# Patient Record
Sex: FEMALE | Race: WHITE | NOT HISPANIC OR LATINO | Employment: UNEMPLOYED | URBAN - NONMETROPOLITAN AREA
[De-identification: names, ages, dates, MRNs, and addresses within clinical notes are randomized per-mention and may not be internally consistent; named-entity substitution may affect disease eponyms.]

---

## 2023-12-05 ENCOUNTER — OFFICE VISIT (OUTPATIENT)
Dept: CARDIOLOGY | Facility: CLINIC | Age: 28
End: 2023-12-05
Payer: COMMERCIAL

## 2023-12-05 VITALS
HEIGHT: 64 IN | OXYGEN SATURATION: 98 % | BODY MASS INDEX: 29.33 KG/M2 | HEART RATE: 91 BPM | WEIGHT: 171.8 LBS | SYSTOLIC BLOOD PRESSURE: 140 MMHG | DIASTOLIC BLOOD PRESSURE: 90 MMHG

## 2023-12-05 DIAGNOSIS — R07.2 PRECORDIAL PAIN: ICD-10-CM

## 2023-12-05 DIAGNOSIS — R06.09 DYSPNEA ON EXERTION: ICD-10-CM

## 2023-12-05 DIAGNOSIS — R42 DIZZINESS: ICD-10-CM

## 2023-12-05 DIAGNOSIS — R00.2 PALPITATIONS: Primary | ICD-10-CM

## 2023-12-05 NOTE — PROGRESS NOTES
"Subjective     Yue Maloney is a 28 y.o. female who presents today for Establish Care (Cardiac Eval. ), Palpitations, Chest Pain (Chest pressure), and Rapid Heart Rate.    CHIEF COMPLIANT  Chief Complaint   Patient presents with    Establish Care     Cardiac Eval.     Palpitations    Chest Pain     Chest pressure    Rapid Heart Rate       Active Problems:  Palpitations  Chest pain  Dizziness  Current smoker      HPI  The patient is a 28-year-old female that was referred for further cardiac evaluation due to recent episodes of chest pain and palpitations.  The patient states that around the first of November she started having episodes where she felt like her heart \"pauses.\"  She will feel like she \"ran a marathon.\"  She will have chest pain which starts midsternal and radiates to the left side and to the top of her back.  When this occurs she has significant fatigue.  The symptoms are occurring every couple days.  She does have associated shortness of breath.  She denies syncope reviewed but will have some occasional dizziness during symptoms.  89 her blood pressure is 110/74.      PRIOR MEDS  No current outpatient medications on file prior to visit.     No current facility-administered medications on file prior to visit.       ALLERGIES  Sulfamethoxazole-trimethoprim    HISTORY  Past Medical History:   Diagnosis Date    Pleurisy        Social History     Socioeconomic History    Marital status:    Tobacco Use    Smoking status: Every Day     Packs/day: 1.00     Years: 14.00     Additional pack years: 0.00     Total pack years: 14.00     Types: Cigarettes    Smokeless tobacco: Never   Substance and Sexual Activity    Alcohol use: Never    Drug use: Never    Sexual activity: Defer       Family History   Problem Relation Age of Onset    Hypertension Mother     Other Father         small arteries    Heart murmur Sister     Hyperlipidemia Maternal Grandmother     Hypertension Maternal Grandmother     Lung cancer " "Maternal Grandmother     Diabetes Maternal Grandmother     COPD Maternal Grandmother     COPD Maternal Grandfather     Hypertension Maternal Grandfather     Heart attack Maternal Grandfather         defibrillator    Other Maternal Grandfather     Hypertension Paternal Grandmother     Stomach cancer Paternal Grandmother     Hyperthyroidism Paternal Grandmother     Heart murmur Paternal Grandmother     COPD Paternal Grandfather     Heart failure Son     Other Son         VSD    Heart murmur Son        Review of Systems   Constitutional:  Positive for fatigue. Negative for chills, diaphoresis and fever.   Eyes: Negative.  Negative for visual disturbance.   Respiratory:  Positive for cough, chest tightness, shortness of breath (diagnosed with Pleurisy on 2 weeks ago) and wheezing. Negative for apnea.    Cardiovascular:  Positive for chest pain (worse at night) and palpitations (heart racing). Negative for leg swelling.   Gastrointestinal: Negative.  Negative for blood in stool.   Endocrine: Negative.  Negative for cold intolerance and heat intolerance.   Genitourinary: Negative.  Negative for hematuria.   Musculoskeletal: Negative.  Negative for arthralgias, back pain, myalgias, neck pain and neck stiffness.   Skin: Negative.  Negative for color change, rash and wound.   Allergic/Immunologic: Negative.  Negative for environmental allergies and food allergies.   Neurological:  Positive for headaches (1 to 2 a week). Negative for dizziness, syncope, weakness, light-headedness and numbness.   Hematological: Negative.  Does not bruise/bleed easily.   Psychiatric/Behavioral:  Positive for sleep disturbance (wakes up with chest pain and worse at night).        Objective     VITALS: /90 (BP Location: Left arm, Patient Position: Sitting)   Pulse 91   Ht 162.6 cm (64\")   Wt 77.9 kg (171 lb 12.8 oz)   SpO2 98%   BMI 29.49 kg/m²     LABS:   Lab Results (most recent)       None            IMAGING:   No Images in the past " 120 days found..    EXAM:  Physical Exam  Constitutional:       Appearance: Normal appearance.   Eyes:      Pupils: Pupils are equal, round, and reactive to light.   Cardiovascular:      Rate and Rhythm: Normal rate and regular rhythm.      Pulses:           Carotid pulses are 2+ on the right side and 2+ on the left side.       Radial pulses are 2+ on the right side and 2+ on the left side.        Dorsalis pedis pulses are 2+ on the right side and 2+ on the left side.        Posterior tibial pulses are 2+ on the right side and 2+ on the left side.      Heart sounds: Normal heart sounds.   Pulmonary:      Effort: Pulmonary effort is normal.      Breath sounds: Normal breath sounds.   Abdominal:      General: Bowel sounds are normal.      Palpations: Abdomen is soft.   Musculoskeletal:      Right lower leg: No edema.      Left lower leg: No edema.   Skin:     General: Skin is warm and dry.      Capillary Refill: Capillary refill takes less than 2 seconds.   Neurological:      General: No focal deficit present.      Mental Status: She is alert and oriented to person, place, and time.   Psychiatric:         Mood and Affect: Mood normal.         Thought Content: Thought content normal.         Procedure     ECG 12 Lead    Date/Time: 12/5/2023 2:57 PM  Performed by: Yue Castro APRN    Authorized by: Yue Castro APRN  Previous ECG: no previous ECG available  Rhythm: sinus rhythm  Rate: normal  BPM: 72  Conduction: conduction normal  ST Segments: ST segments normal  T Waves: T waves normal  QRS axis: normal  Comments: No acute changes  Qtc 420ms             Assessment & Plan    Diagnosis Plan   1. Palpitations  Mobile Cardiac Outpatient Telemetry      2. Precordial pain  Treadmill Stress Test    Adult Transthoracic Echo Complete W/ Cont if Necessary Per Protocol      3. Dizziness  Treadmill Stress Test    Adult Transthoracic Echo Complete W/ Cont if Necessary Per Protocol      4. Dyspnea on exertion   Treadmill Stress Test    Adult Transthoracic Echo Complete W/ Cont if Necessary Per Protocol        Plan:  1.  Palpitations will place patient on a cardiac event monitor to see if we can determine the etiology of her palpitations.  Will plan to see the patient back to review results.  2.  Precordial pain: We will schedule patient for treadmill stress test for ischemic evaluation and an echocardiogram to evaluate LV function and structural anatomy.  3.  Dizziness we will proceed with testing as scribed above.  4.  Dyspnea on exertion: We will proceed with testing as described above.  He will get    No follow-ups on file.    Yue Haider  reports that she has been smoking cigarettes. She has a 14.00 pack-year smoking history. She has never used smokeless tobacco.. I have educated her on the risk of diseases from using tobacco products such as cancer, COPD, and heart disease.     I advised her to quit and she is not willing to quit.    I spent 3  minutes counseling the patient.    The patient is currently on no regular medications.             MEDS ORDERED DURING VISIT:  No orders of the defined types were placed in this encounter.      DISCONTINUED MEDS DURING VISIT:   There are no discontinued medications.       This document has been electronically signed by OCTAVOI Soto  December 5, 2023 16:01 EST    Dictated Utilizing Dragon Dictation: Part of this note may be an electronic transcription/translation of spoken language to printed text using the Dragon Dictation System

## 2024-02-08 ENCOUNTER — HOSPITAL ENCOUNTER (OUTPATIENT)
Dept: CARDIOLOGY | Facility: HOSPITAL | Age: 29
Discharge: HOME | End: 2024-02-08
Payer: COMMERCIAL

## 2024-02-08 DIAGNOSIS — R06.09 DYSPNEA ON EXERTION: ICD-10-CM

## 2024-02-08 DIAGNOSIS — R42 DIZZINESS: ICD-10-CM

## 2024-02-08 DIAGNOSIS — R07.2 PRECORDIAL PAIN: ICD-10-CM

## 2024-02-08 LAB
BH CV ECHO MEAS - ACS: 2 CM
BH CV ECHO MEAS - AO MAX PG: 8.5 MMHG
BH CV ECHO MEAS - AO MEAN PG: 5 MMHG
BH CV ECHO MEAS - AO ROOT DIAM: 2.7 CM
BH CV ECHO MEAS - AO V2 MAX: 146 CM/SEC
BH CV ECHO MEAS - AO V2 VTI: 28.9 CM
BH CV ECHO MEAS - EDV(CUBED): 84 ML
BH CV ECHO MEAS - EDV(MOD-SP4): 70.5 ML
BH CV ECHO MEAS - EF(MOD-SP4): 59.4 %
BH CV ECHO MEAS - EF_3D-VOL: 62 %
BH CV ECHO MEAS - ESV(CUBED): 17.6 ML
BH CV ECHO MEAS - ESV(MOD-SP4): 28.6 ML
BH CV ECHO MEAS - FS: 40.6 %
BH CV ECHO MEAS - IVS/LVPW: 0.93 CM
BH CV ECHO MEAS - IVSD: 0.95 CM
BH CV ECHO MEAS - LA DIMENSION: 2.9 CM
BH CV ECHO MEAS - LAT PEAK E' VEL: 19.6 CM/SEC
BH CV ECHO MEAS - LV DIASTOLIC VOL/BSA (35-75): 38.5 CM2
BH CV ECHO MEAS - LV MASS(C)D: 144 GRAMS
BH CV ECHO MEAS - LV SYSTOLIC VOL/BSA (12-30): 15.6 CM2
BH CV ECHO MEAS - LVIDD: 4.4 CM
BH CV ECHO MEAS - LVIDS: 2.6 CM
BH CV ECHO MEAS - LVPWD: 1.02 CM
BH CV ECHO MEAS - MED PEAK E' VEL: 11.7 CM/SEC
BH CV ECHO MEAS - MV A MAX VEL: 83.1 CM/SEC
BH CV ECHO MEAS - MV DEC SLOPE: 516 CM/SEC2
BH CV ECHO MEAS - MV E MAX VEL: 83.6 CM/SEC
BH CV ECHO MEAS - MV E/A: 1.01
BH CV ECHO MEAS - RVDD: 2.13 CM
BH CV ECHO MEAS - SI(MOD-SP4): 22.9 ML/M2
BH CV ECHO MEAS - SV(MOD-SP4): 41.9 ML
BH CV ECHO MEASUREMENTS AVERAGE E/E' RATIO: 5.34
BH CV STRESS RECOVERY BP: NORMAL MMHG
BH CV STRESS RECOVERY HR: 117 BPM
BH CV XLRA - RV BASE: 3.2 CM
BH CV XLRA - RV LENGTH: 7.2 CM
BH CV XLRA - RV MID: 2.4 CM
LEFT ATRIUM VOLUME INDEX: 18 ML/M2
MAXIMAL PREDICTED HEART RATE: 190 BPM
PERCENT MAX PREDICTED HR: 95.26 %
STRESS BASELINE BP: NORMAL MMHG
STRESS BASELINE HR: 100 BPM
STRESS PERCENT HR: 112 %
STRESS POST ESTIMATED WORKLOAD: 7 METS
STRESS POST EXERCISE DUR MIN: 6 MIN
STRESS POST PEAK BP: NORMAL MMHG
STRESS POST PEAK HR: 181 BPM
STRESS TARGET HR: 162 BPM

## 2024-02-08 PROCEDURE — 93306 TTE W/DOPPLER COMPLETE: CPT

## 2024-02-08 PROCEDURE — 93017 CV STRESS TEST TRACING ONLY: CPT

## 2024-02-09 ENCOUNTER — TELEPHONE (OUTPATIENT)
Dept: CARDIOLOGY | Facility: CLINIC | Age: 29
End: 2024-02-09
Payer: COMMERCIAL

## 2024-02-09 NOTE — TELEPHONE ENCOUNTER
Notified pt of OCTAVIO Simms's recommendations.  Pt requesting to know if we have monitor results back. Per OCTAVIO Simms we have not received but will call when we these have been reviewed. Pt verbalizes understanding and recommendations. Pt is aware to call with any questions or concerns.    Faxed results to pcp     ----- Message from OCTAVIO Greene sent at 2/9/2024  8:09 AM EST -----  No EKG evidence of ischemia.  No exercise induced dysrhythmia . Echo pending.      Stress test results    Result Text  1.  Adequate functional capacity without chest pain.  The patient exercised 6 minutes on a Alhaji protocol to a heart rate of 181, systolic blood pressure 193, and O2 consumption of 7 METS, and to 95% of age-adjusted maximum predicted heart rate.     2.  Somewhat exaggerated heart rate and blood pressure responses to exercise     3.  No EKG evidence of ischemia.     4.  No exercise-induced dysrhythmia.

## 2024-02-14 LAB
BH CV ECHO MEAS - ACS: 2 CM
BH CV ECHO MEAS - AO MAX PG: 8.5 MMHG
BH CV ECHO MEAS - AO MEAN PG: 5 MMHG
BH CV ECHO MEAS - AO ROOT DIAM: 2.7 CM
BH CV ECHO MEAS - AO V2 MAX: 146 CM/SEC
BH CV ECHO MEAS - AO V2 VTI: 28.9 CM
BH CV ECHO MEAS - EDV(CUBED): 84 ML
BH CV ECHO MEAS - EDV(MOD-SP4): 70.5 ML
BH CV ECHO MEAS - EF(MOD-SP4): 59.4 %
BH CV ECHO MEAS - EF_3D-VOL: 62 %
BH CV ECHO MEAS - ESV(CUBED): 17.6 ML
BH CV ECHO MEAS - ESV(MOD-SP4): 28.6 ML
BH CV ECHO MEAS - FS: 40.6 %
BH CV ECHO MEAS - IVS/LVPW: 0.93 CM
BH CV ECHO MEAS - IVSD: 0.95 CM
BH CV ECHO MEAS - LA DIMENSION: 2.9 CM
BH CV ECHO MEAS - LAT PEAK E' VEL: 19.6 CM/SEC
BH CV ECHO MEAS - LV DIASTOLIC VOL/BSA (35-75): 38.5 CM2
BH CV ECHO MEAS - LV MASS(C)D: 144 GRAMS
BH CV ECHO MEAS - LV SYSTOLIC VOL/BSA (12-30): 15.6 CM2
BH CV ECHO MEAS - LVIDD: 4.4 CM
BH CV ECHO MEAS - LVIDS: 2.6 CM
BH CV ECHO MEAS - LVPWD: 1.02 CM
BH CV ECHO MEAS - MED PEAK E' VEL: 11.7 CM/SEC
BH CV ECHO MEAS - MV A MAX VEL: 83.1 CM/SEC
BH CV ECHO MEAS - MV DEC SLOPE: 516 CM/SEC2
BH CV ECHO MEAS - MV E MAX VEL: 83.6 CM/SEC
BH CV ECHO MEAS - MV E/A: 1.01
BH CV ECHO MEAS - RVDD: 2.13 CM
BH CV ECHO MEAS - SI(MOD-SP4): 22.9 ML/M2
BH CV ECHO MEAS - SV(MOD-SP4): 41.9 ML
BH CV ECHO MEASUREMENTS AVERAGE E/E' RATIO: 5.34
BH CV XLRA - RV BASE: 3.2 CM
BH CV XLRA - RV LENGTH: 7.2 CM
BH CV XLRA - RV MID: 2.4 CM
LEFT ATRIUM VOLUME INDEX: 18 ML/M2

## 2024-02-19 ENCOUNTER — TELEPHONE (OUTPATIENT)
Dept: CARDIOLOGY | Facility: CLINIC | Age: 29
End: 2024-02-19
Payer: COMMERCIAL

## 2024-02-19 NOTE — TELEPHONE ENCOUNTER
ECHO  Pt notified of no acute findings. Provider will discuss results at f/u. Pt reminded of appt date and time.  ----- Message from Gema De La Fuente LPN sent at 2/19/2024  8:37 AM EST -----    ----- Message -----  From: Yue Castro APRN  Sent: 2/16/2024   1:46 PM EST  To: Gema De La Fuente LPN    EF 55-60%.  No significant valvular abnormalities.

## 2024-03-05 ENCOUNTER — OFFICE VISIT (OUTPATIENT)
Dept: CARDIOLOGY | Facility: CLINIC | Age: 29
End: 2024-03-05
Payer: COMMERCIAL

## 2024-03-05 ENCOUNTER — LAB (OUTPATIENT)
Dept: LAB | Facility: HOSPITAL | Age: 29
End: 2024-03-05
Payer: COMMERCIAL

## 2024-03-05 VITALS
DIASTOLIC BLOOD PRESSURE: 89 MMHG | SYSTOLIC BLOOD PRESSURE: 129 MMHG | WEIGHT: 174.8 LBS | OXYGEN SATURATION: 96 % | HEART RATE: 91 BPM | HEIGHT: 64 IN | BODY MASS INDEX: 29.84 KG/M2

## 2024-03-05 DIAGNOSIS — R00.2 PALPITATIONS: Primary | ICD-10-CM

## 2024-03-05 DIAGNOSIS — R00.0 SINUS TACHYCARDIA: ICD-10-CM

## 2024-03-05 DIAGNOSIS — R07.2 PRECORDIAL PAIN: ICD-10-CM

## 2024-03-05 DIAGNOSIS — R00.1 BRADYCARDIA, SINUS: ICD-10-CM

## 2024-03-05 DIAGNOSIS — R55 NEAR SYNCOPE: ICD-10-CM

## 2024-03-05 DIAGNOSIS — I47.10 PSVT (PAROXYSMAL SUPRAVENTRICULAR TACHYCARDIA): ICD-10-CM

## 2024-03-05 LAB
CHOLEST SERPL-MCNC: 248 MG/DL (ref 0–200)
HDLC SERPL-MCNC: 34 MG/DL (ref 40–60)
LDLC SERPL CALC-MCNC: 175 MG/DL (ref 0–100)
LDLC/HDLC SERPL: 5.06 {RATIO}
TRIGL SERPL-MCNC: 209 MG/DL (ref 0–150)
VLDLC SERPL-MCNC: 39 MG/DL (ref 5–40)

## 2024-03-05 PROCEDURE — 99214 OFFICE O/P EST MOD 30 MIN: CPT | Performed by: CLINICAL NURSE SPECIALIST

## 2024-03-05 PROCEDURE — 80061 LIPID PANEL: CPT

## 2024-03-05 PROCEDURE — 36415 COLL VENOUS BLD VENIPUNCTURE: CPT

## 2024-03-05 RX ORDER — AMOXICILLIN 500 MG/1
1000 CAPSULE ORAL 2 TIMES DAILY
COMMUNITY

## 2024-03-05 NOTE — PROGRESS NOTES
Subjective     Yue Maloney is a 28 y.o. female who presents today for Follow-up (Stress/echo/monitor results).    CHIEF COMPLIANT  Chief Complaint   Patient presents with    Follow-up     Stress/echo/monitor results       Active Problems:  Palpitations  1.1 cardiac event monitor 12/2023: 2 episodes of SVT no patient triggered.  Episodes of sinus tachycardia with rate up to 184.  Episode of sinus bradycardia with heart rate 45 bpm.  Patient triggered events associated with sinus mechanism.  Chest pain  2.1 2/8/24: treadmill stress: No EKG evidence of ischemia.  No exercise-induced dysrhythmia.  Somewhat exaggerated heart rate and blood pressure response to exercise.  Dizziness  Current smoker  5.  Echocardiogram 2/8/2024: EF 55 to 60%.  No significant valvular abnormality.       HPI  The patient is a 28-year-old female that returns for follow-up to discuss recent testing.  She underwent a treadmill stress test on 2/8/2024 which showed no EKG evidence of ischemia and no exercise-induced dysrhythmia.  There was a somewhat exaggerated heart rate and blood pressure response to exercise.  She underwent an echocardiogram on 2/8/2024 which showed an EF of 55 to 60% and no significant valvular abnormality.  She was placed on a cardiac event monitor which showed 2 brief episodes of SVT.  There were episodes of sinus tachycardia with heart rate up to 184.  There was also an episode of sinus bradycardia with heart rate down to 45 bpm.  Since the patient's last visit she has had an emergency department visit due to palpitations, tachycardia and chest pain.  The patient states that she was at home and started having palpitations.  She ended up going to primary care where an EKG was completed which showed sinus tachycardia with heart rate 150.  This occurred on December 13.  In review of her event monitor from around the same time she did have episodes of sinus tachycardia with rates as high as 184.  In the emergency department  her EKG showed sinus tachycardia with no acute changes.  Her potassium was 3.5.  Magnesium 2.2.  Troponins are negative x 2.  TSH was 0.593.  D-dimer was negative.   The patient states that since the end of January she has had multiple episodes of near syncope and when she checks her heart rate it will be in the low 40s.  Her heart rate has been as low as 38.  On her event monitor her heart rate got as low as 45.  On review of this it did occur when the patient was awake.      PRIOR MEDS  Current Outpatient Medications on File Prior to Visit   Medication Sig Dispense Refill    amoxicillin (AMOXIL) 500 MG capsule Take 2 capsules by mouth 2 (Two) Times a Day.       No current facility-administered medications on file prior to visit.       ALLERGIES  Sulfamethoxazole-trimethoprim    HISTORY  Past Medical History:   Diagnosis Date    Pleurisy        Social History     Socioeconomic History    Marital status:    Tobacco Use    Smoking status: Every Day     Current packs/day: 1.00     Average packs/day: 1 pack/day for 14.0 years (14.0 ttl pk-yrs)     Types: Cigarettes    Smokeless tobacco: Never   Substance and Sexual Activity    Alcohol use: Never    Drug use: Never    Sexual activity: Defer       Family History   Problem Relation Age of Onset    Hypertension Mother     Other Father         small arteries    Heart murmur Sister     Hyperlipidemia Maternal Grandmother     Hypertension Maternal Grandmother     Lung cancer Maternal Grandmother     Diabetes Maternal Grandmother     COPD Maternal Grandmother     COPD Maternal Grandfather     Hypertension Maternal Grandfather     Heart attack Maternal Grandfather         defibrillator    Other Maternal Grandfather     Hypertension Paternal Grandmother     Stomach cancer Paternal Grandmother     Hyperthyroidism Paternal Grandmother     Heart murmur Paternal Grandmother     COPD Paternal Grandfather     Heart failure Son     Other Son         VSD    Heart murmur Son   "      Review of Systems   Constitutional:  Positive for fatigue. Negative for chills, diaphoresis and fever.   HENT:  Positive for sore throat (had strep throat last week).    Eyes: Negative.  Negative for visual disturbance.   Respiratory:  Positive for chest tightness. Negative for apnea, cough, shortness of breath and wheezing.    Cardiovascular:  Positive for chest pain (some chest pain yesterday; none today) and palpitations (occas.). Negative for leg swelling.   Gastrointestinal: Negative.  Negative for constipation.   Endocrine: Negative.  Negative for cold intolerance and heat intolerance.   Genitourinary: Negative.  Negative for hematuria.   Musculoskeletal: Negative.  Negative for arthralgias, back pain, myalgias, neck pain and neck stiffness.   Skin: Negative.  Negative for color change, rash and wound.   Allergic/Immunologic: Negative.  Negative for environmental allergies and food allergies.   Neurological: Negative.  Negative for dizziness, syncope, weakness, light-headedness, numbness and headaches.   Hematological: Negative.  Does not bruise/bleed easily.   Psychiatric/Behavioral:  Positive for sleep disturbance (waking up occas with palps).        Objective     VITALS: /89 (BP Location: Left arm, Patient Position: Sitting)   Pulse 91   Ht 162.6 cm (64.02\")   Wt 79.3 kg (174 lb 12.8 oz)   SpO2 96%   BMI 29.99 kg/m²     LABS:   Lab Results (most recent)       None            IMAGING:   No Images in the past 120 days found..    EXAM:    Constitutional:       Appearance: Normal appearance.   Eyes:      Pupils: Pupils are equal, round, and reactive to light.   Cardiovascular:      Rate and Rhythm: Normal rate and regular rhythm.      Pulses:           Carotid pulses are 2+ on the right side and 2+ on the left side.       Radial pulses are 2+ on the right side and 2+ on the left side.        Dorsalis pedis pulses are 2+ on the right side and 2+ on the left side.        Posterior tibial pulses " are 2+ on the right side and 2+ on the left side.      Heart sounds: Normal heart sounds.   Pulmonary:      Effort: Pulmonary effort is normal.      Breath sounds: Normal breath sounds.   Abdominal:      General: Bowel sounds are normal.      Palpations: Abdomen is soft.   Musculoskeletal:      Right lower leg: No edema.      Left lower leg: No edema.   Skin:     General: Skin is warm and dry.      Capillary Refill: Capillary refill takes less than 2 seconds.   Neurological:      General: No focal deficit present.      Mental Status: She is alert and oriented to person, place, and time.   Psychiatric:         Mood and Affect: Mood normal.         Thought Content: Thought content normal.      Procedure   Procedures       Assessment & Plan    Diagnosis Plan   1. Palpitations  Ambulatory Referral to Cardiac Electrophysiology    Mobile Cardiac Outpatient Telemetry      2. PSVT (paroxysmal supraventricular tachycardia)  Ambulatory Referral to Cardiac Electrophysiology      3. Sinus tachycardia  Ambulatory Referral to Cardiac Electrophysiology      4. Bradycardia, sinus  Ambulatory Referral to Cardiac Electrophysiology    Mobile Cardiac Outpatient Telemetry      5. Near syncope  Ambulatory Referral to Cardiac Electrophysiology    Mobile Cardiac Outpatient Telemetry      6. Precordial pain  Lipid Panel        Plan:  1.  Palpitations: The patient's event monitor shows 2 episodes of paroxysmal supraventricular tachycardia.  It also shows episodes of sinus tachycardia with rates as high as 184 as well as an episode of sinus bradycardia with her heart rate as low as 45 bpm.  The patient continues to be very symptomatic.  When her heart rate is elevated she will have chest pressure, shortness of air.  When her heart rate is low she has significant fatigue, weakness and near syncope.  The patient states that since the end of January she has had increased episodes of her heart rate dropping low.  She states this is occurring  almost every day.  Will make referral to EP for further evaluation and treatment.  Will also place another event monitor to obtain more information regarding bradycardia.  2.  Paroxysmal supraventricular tachycardia: There were only 2 brief episodes of SVT on the patient's event monitor.  She did not report symptoms during these events.  3.  Sinus tachycardia: There were episodes of sinus tachycardia on event monitor with heart rate up to 184.  Patient was very symptomatic during this time and did end up in the emergency department.  As she is also having episodes of symptomatic sinus bradycardia I am reluctant to start rate control medication at this time.  Will make referral to EP for further evaluation and treatment.  4.  Sinus bradycardia: Treatment plan as above.  Will place event monitor to obtain further information regarding bradycardia.  5.  Near syncope: Plan as described above.    Return in about 3 months (around 6/5/2024).    Yue Haider  reports that she has been smoking cigarettes. She has a 14 pack-year smoking history. She has never used smokeless tobacco.. I have educated her on the risk of diseases from using tobacco products such as cancer, COPD, and heart disease.     I advised her to quit and she is not willing to quit.    I spent 3  minutes counseling the patient.       The patient is currently on no regular medications.           MEDS ORDERED DURING VISIT:  No orders of the defined types were placed in this encounter.      DISCONTINUED MEDS DURING VISIT:   There are no discontinued medications.       This document has been electronically signed by OCTAVIO Soto  March 5, 2024 12:33 EST    Dictated Utilizing Dragon Dictation: Part of this note may be an electronic transcription/translation of spoken language to printed text using the Dragon Dictation System

## 2024-03-06 ENCOUNTER — TELEPHONE (OUTPATIENT)
Dept: CARDIOLOGY | Facility: CLINIC | Age: 29
End: 2024-03-06
Payer: COMMERCIAL

## 2024-03-06 DIAGNOSIS — E78.5 DYSLIPIDEMIA: Primary | ICD-10-CM

## 2024-03-06 RX ORDER — ROSUVASTATIN CALCIUM 20 MG/1
20 TABLET, COATED ORAL DAILY
Qty: 30 TABLET | Refills: 11 | Status: SHIPPED | OUTPATIENT
Start: 2024-03-06

## 2024-03-06 NOTE — TELEPHONE ENCOUNTER
Notified pt of OCTAVIO Simms's recommendations. Pt verbalizes understanding of recommendations and agreeable to statin medication and to check blood work in 3 months.  Pt is aware to call with any questions or concerns.       ----- Message from OCTAVIO Greene sent at 3/6/2024  8:31 AM EST -----  Her cholesterol is elevated.  Overall is 248.  LDL is 175.  HDL is 34.  Triglycerides 208.  Would recommend she make diet modifications but at this level she needs to start a statin.  I would like to start her on Rosuvastatin.  If she is agreeable pl  ease send in Rosuvastatin 20mg PO daily.  Will want to recheck a lipid panel in 3 months.

## 2024-03-06 NOTE — PROGRESS NOTES
Her cholesterol is elevated.  Overall is 248.  LDL is 175.  HDL is 34.  Triglycerides 208.  Would recommend she make diet modifications but at this level she needs to start a statin.  I would like to start her on Rosuvastatin.  If she is agreeable please send in Rosuvastatin 20mg PO daily.  Will want to recheck a lipid panel in 3 months.

## 2024-03-19 ENCOUNTER — OFFICE VISIT (OUTPATIENT)
Dept: CARDIOLOGY | Facility: CLINIC | Age: 29
End: 2024-03-19
Payer: COMMERCIAL

## 2024-03-19 VITALS
HEART RATE: 92 BPM | BODY MASS INDEX: 29.98 KG/M2 | WEIGHT: 175.6 LBS | SYSTOLIC BLOOD PRESSURE: 122 MMHG | OXYGEN SATURATION: 99 % | HEIGHT: 64 IN | DIASTOLIC BLOOD PRESSURE: 80 MMHG

## 2024-03-19 DIAGNOSIS — G90.9 AUTONOMIC DYSFUNCTION: Primary | ICD-10-CM

## 2024-03-19 PROCEDURE — 99204 OFFICE O/P NEW MOD 45 MIN: CPT | Performed by: STUDENT IN AN ORGANIZED HEALTH CARE EDUCATION/TRAINING PROGRAM

## 2024-03-19 NOTE — PROGRESS NOTES
Cardiac Electrophysiology Outpatient Note  [facility] at [facility]    Office Visit     Yue Maloney  [id number]  03/19/2024    Primary Care Physician: Laura Pantoja APRN    Referred By: Yue Castro APRN    Subjective     Chief Complaint   Patient presents with    Palpitations     Problem List:  Palpitations/tachycardia  14d Zio 12/2023: 45/79/184 bpm, 2 episodes of SVT longest 10 beats, no significant ectopy or other arrhythmia  TTE 2/2024: LVEF 61-65%, diastolic function normal, no VHD  GXT 2/2024: 6 minutes on Alhaji protocol heart rate up to 181 beats per minutes, somewhat exaggerated heart rate and blood pressure response to exercise, no evidence of ischemia on EKG, no arrhythmia    History of Present Illness:   Yue Maloney is a 28 y.o. female who presents to my electrophysiology clinic as a referral from OCTAVIO Lopez for palpitations, paroxysmal SVT, sinus bradycardia.  She began having symptoms in October 2023.  This was a few weeks after having COVID.  At first she noticed a significantly high heart rate.  In December or January check she started to notice lower heart rates.  She has symptoms of palpitations.  She gets a sinking feeling in her chest and feels very tired thereafter.  Has not noted any nausea or flushing with this.  Did wear an ambulatory monitor that showed some very short runs of atrial tachycardia average heart rate of 79 bpm.    Past Medical History:   Diagnosis Date    Pleurisy        Past Surgical History:   Procedure Laterality Date    TUBAL ABDOMINAL LIGATION      WISDOM TOOTH EXTRACTION         Family History   Problem Relation Age of Onset    Hypertension Mother     Other Father         small arteries    Heart murmur Sister     Hyperlipidemia Maternal Grandmother     Hypertension Maternal Grandmother     Lung cancer Maternal Grandmother     Diabetes Maternal Grandmother     COPD Maternal Grandmother     COPD Maternal  "Grandfather     Hypertension Maternal Grandfather     Heart attack Maternal Grandfather         defibrillator    Other Maternal Grandfather     Hypertension Paternal Grandmother     Stomach cancer Paternal Grandmother     Hyperthyroidism Paternal Grandmother     Heart murmur Paternal Grandmother     COPD Paternal Grandfather     Heart failure Son     Other Son         VSD    Heart murmur Son        Social History     Socioeconomic History    Marital status:    Tobacco Use    Smoking status: Every Day     Current packs/day: 1.00     Average packs/day: 1 pack/day for 14.0 years (14.0 ttl pk-yrs)     Types: Cigarettes    Smokeless tobacco: Never   Substance and Sexual Activity    Alcohol use: Never    Drug use: Never    Sexual activity: Defer         Current Outpatient Medications:     rosuvastatin (CRESTOR) 20 MG tablet, Take 1 tablet by mouth Daily., Disp: 30 tablet, Rfl: 11    Allergies:   Allergies   Allergen Reactions    Sulfamethoxazole-Trimethoprim Hives       Objective   Vital Signs: Blood pressure 122/80, pulse 92, height 162.6 cm (64\"), weight 79.7 kg (175 lb 9.6 oz), SpO2 99%.    PHYSICAL EXAM  General appearance: Awake, alert, cooperative  Head: Normocephalic, without obvious abnormality, atraumatic  Neck: No JVD  Lungs: Clear to ascultation bilaterally  Heart: Regular rate and rhythm, no murmurs, 2+ LE pulses, no lower extremity swelling  Skin: Skin color, turgor normal, no rashes or lesions  Neurologic: Grossly normal     No results found for: \"GLUCOSE\", \"CALCIUM\", \"NA\", \"K\", \"CO2\", \"CL\", \"BUN\", \"CREATININE\", \"EGFRIFAFRI\", \"EGFRIFNONA\", \"BCR\", \"ANIONGAP\"  No results found for: \"WBC\", \"HGB\", \"HCT\", \"MCV\", \"PLT\"  No results found for: \"INR\", \"PROTIME\"  No results found for: \"TSH\", \"S0MQFRV\", \"U4PAWOA\", \"THYROIDAB\"       Results for orders placed during the hospital encounter of 02/08/24    Adult Transthoracic Echo Complete W/ Cont if Necessary Per Protocol    Interpretation Summary    Left " ventricular ejection fraction appears to be 61 - 65%.    Left ventricular diastolic function was normal.    Very good to excellent technical quality.    1.  LV size, function, wall motion, and wall thickness are normal.  Visually estimated ejection fraction is 55 to 60%.  In apical four-chamber views there is a suggestion of a discontinuity in the interventricular septum at the level of the midportion of the muscular septum.  However, no transseptal flow can be identified on color Doppler imaging.  Therefore this finding most likely represents a large septal .  The atria and right ventricle are normal.  No septal defect or intracavitary mass or thrombus.    2.  Valves are morphologically and physiologically normal with no stenotic lesions, valve associated masses or thrombi, or hemodynamically significant regurgitation.    3.  No pericardial or great vessel pathology.    4.  RV and PA systolic pressures cannot be calculated.         I personally viewed and interpreted the patient's EKG/Telemetry/lab data    Procedures    Yue Maloney  reports that she has been smoking cigarettes. She has a 14 pack-year smoking history. She has never used smokeless tobacco.         Assessment & Plan    1. Autonomic dysfunction  Patient has a complex of symptoms likely attributable to cardiac autonomic dysfunction post-COVID infection.  She has significant swings in both her heart rate and blood pressure.  This leads to significant symptoms.  She does not currently meet criteria for inappropriate sinus tachycardia based on her most recent monitor with an average heart rate of 79 bpm.  She did turn in a new monitor today and we will follow-up the results of this.  Also probably does have some orthostatic hypotension.  Also has times where her blood pressure is high.  Does not have vasovagal syncope.    Discussed the pathophysiology of cardiac autonomic dysfunction status post COVID infection.  We discussed that a lot of  this is not understood, but some studies have shown direct nerve damage related to the COVID virus.  Most patients do improve spontaneously, but this sometimes takes 1 to 2 years.  The only treatment shown to modify the course of this is an exercise routine including both aerobic and resistance training.  Otherwise, we can consider interventions to improve symptoms such as beta-blocker for persistent tachycardia or midodrine for persistent hypotension.  At the current time, given her swings of both blood pressure and heart rate I do not think any medication will be significantly beneficial.       Follow Up:  Return in about 6 months (around 9/19/2024) for Recheck.      Thank you for allowing me to participate in the care of your patient. Please do not hesitate to contact me with additional questions or concerns.      Bogdan Jenkins M.D.  Cardiac Electrophysiologist  [facility] / [facility]

## 2024-06-05 ENCOUNTER — LAB (OUTPATIENT)
Dept: LAB | Facility: HOSPITAL | Age: 29
End: 2024-06-05
Payer: COMMERCIAL

## 2024-06-05 ENCOUNTER — TELEPHONE (OUTPATIENT)
Dept: CARDIOLOGY | Facility: CLINIC | Age: 29
End: 2024-06-05
Payer: COMMERCIAL

## 2024-06-05 DIAGNOSIS — E78.5 DYSLIPIDEMIA: ICD-10-CM

## 2024-06-05 LAB
CHOLEST SERPL-MCNC: 146 MG/DL (ref 0–200)
HDLC SERPL-MCNC: 33 MG/DL (ref 40–60)
LDLC SERPL CALC-MCNC: 93 MG/DL (ref 0–100)
LDLC/HDLC SERPL: 2.79 {RATIO}
TRIGL SERPL-MCNC: 105 MG/DL (ref 0–150)
VLDLC SERPL-MCNC: 20 MG/DL (ref 5–40)

## 2024-06-05 PROCEDURE — 36415 COLL VENOUS BLD VENIPUNCTURE: CPT

## 2024-06-05 PROCEDURE — 80061 LIPID PANEL: CPT

## 2024-06-05 NOTE — TELEPHONE ENCOUNTER
----- Message from Yue Castro sent at 6/5/2024  4:19 PM EDT -----  Cholesterol looks much better.  Overall cholesterol down to 146.  LDL down to 93.  Continue statin.  ----- Message -----  From: Lab, Background User  Sent: 6/5/2024   3:41 PM EDT  To: OCTAVIO Greene

## 2024-06-06 ENCOUNTER — OFFICE VISIT (OUTPATIENT)
Dept: CARDIOLOGY | Facility: CLINIC | Age: 29
End: 2024-06-06
Payer: COMMERCIAL

## 2024-06-06 VITALS
DIASTOLIC BLOOD PRESSURE: 88 MMHG | BODY MASS INDEX: 29.47 KG/M2 | WEIGHT: 172.6 LBS | HEIGHT: 64 IN | OXYGEN SATURATION: 97 % | HEART RATE: 89 BPM | SYSTOLIC BLOOD PRESSURE: 121 MMHG

## 2024-06-06 DIAGNOSIS — R00.2 PALPITATIONS: Primary | ICD-10-CM

## 2024-06-06 DIAGNOSIS — G90.9 AUTONOMIC DYSFUNCTION: ICD-10-CM

## 2024-06-06 DIAGNOSIS — I47.10 PSVT (PAROXYSMAL SUPRAVENTRICULAR TACHYCARDIA): ICD-10-CM

## 2024-06-06 PROCEDURE — 99214 OFFICE O/P EST MOD 30 MIN: CPT | Performed by: CLINICAL NURSE SPECIALIST

## 2024-06-06 NOTE — PROGRESS NOTES
Subjective     Yue Birdie Maloney is a 29 y.o. female who presents today for Follow-up (3 month f/up).    CHIEF COMPLIANT  Chief Complaint   Patient presents with    Follow-up     3 month f/up       Active Problems:  Palpitations  1.1 cardiac event monitor 12/2023: 2 episodes of SVT no patient triggered.  Episodes of sinus tachycardia with rate up to 184.  Episode of sinus bradycardia with heart rate 45 bpm.  Patient triggered events associated with sinus mechanism.  Chest pain  2.1 2/8/24: treadmill stress: No EKG evidence of ischemia.  No exercise-induced dysrhythmia.  Somewhat exaggerated heart rate and blood pressure response to exercise.  Dizziness  Current smoker  5.  Echocardiogram 2/8/2024: EF 55 to 60%.  No significant valvular abnormality.       HPI  The patient is a 29-year-old female that returns for follow-up.  She had been referred to Dr. Jenkins due to episodes of paroxysmal SVT and continued palpitations.  Per his note suspect causes direct nerve damage related to the COVID virus.  Per Dr. Jenkins most patients do improve spontaneously within 1 to 2 years.  Symptom management with beta-blockers for persistent tachycardia or midodrine for persistent hypotension can be considered depending on patient's symptoms.  Patient states she had done well for a few months but over the past several weeks she has had returning to tachypalpitations.  She will wake up in the morning and her heart is racing.  She states when this occurs it will occur off and on for several days and then she may go another few weeks without symptoms.  Her blood pressure is doing much better.  It has not been running as low.  To her knowledge she has had no further issues with her heart rate dropping low.  She denies chest pain, syncope, near syncope.      PRIOR MEDS  Current Outpatient Medications on File Prior to Visit   Medication Sig Dispense Refill    rosuvastatin (CRESTOR) 20 MG tablet Take 1 tablet by mouth Daily. 30 tablet 11  "    No current facility-administered medications on file prior to visit.       ALLERGIES  Sulfamethoxazole-trimethoprim    HISTORY  Past Medical History:   Diagnosis Date    Pleurisy        Social History     Socioeconomic History    Marital status:    Tobacco Use    Smoking status: Every Day     Current packs/day: 1.00     Average packs/day: 1 pack/day for 14.0 years (14.0 ttl pk-yrs)     Types: Cigarettes    Smokeless tobacco: Never   Substance and Sexual Activity    Alcohol use: Never    Drug use: Never    Sexual activity: Defer       Family History   Problem Relation Age of Onset    Hypertension Mother     Other Father         small arteries    Heart murmur Sister     Hyperlipidemia Maternal Grandmother     Hypertension Maternal Grandmother     Lung cancer Maternal Grandmother     Diabetes Maternal Grandmother     COPD Maternal Grandmother     COPD Maternal Grandfather     Hypertension Maternal Grandfather     Heart attack Maternal Grandfather         defibrillator    Other Maternal Grandfather     Hypertension Paternal Grandmother     Stomach cancer Paternal Grandmother     Hyperthyroidism Paternal Grandmother     Heart murmur Paternal Grandmother     COPD Paternal Grandfather     Heart failure Son     Other Son         VSD    Heart murmur Son        Review of Systems   HENT:  Negative for congestion, postnasal drip, rhinorrhea, sinus pressure, sinus pain and sore throat.    Respiratory:  Negative for chest tightness, shortness of breath and wheezing.    Cardiovascular:  Positive for chest pain (feels like a sting) and palpitations. Negative for leg swelling.   Gastrointestinal: Negative.    Genitourinary: Negative.    Neurological:  Negative for dizziness, syncope, light-headedness, numbness and headaches.   Psychiatric/Behavioral:  Negative for sleep disturbance.        Objective     VITALS: /88   Pulse 89   Ht 162.6 cm (64.02\")   Wt 78.3 kg (172 lb 9.6 oz)   SpO2 97%   BMI 29.61 kg/m² "     LABS:   Lab Results (most recent)       None            IMAGING:   No Images in the past 120 days found..    EXAM:  Constitutional:       Appearance: Normal appearance.   Eyes:      Pupils: Pupils are equal, round, and reactive to light.   Cardiovascular:      Rate and Rhythm: Normal rate and regular rhythm.      Pulses:           Carotid pulses are 2+ on the right side and 2+ on the left side.       Radial pulses are 2+ on the right side and 2+ on the left side.        Dorsalis pedis pulses are 2+ on the right side and 2+ on the left side.        Posterior tibial pulses are 2+ on the right side and 2+ on the left side.      Heart sounds: Normal heart sounds.   Pulmonary:      Effort: Pulmonary effort is normal.      Breath sounds: Normal breath sounds.   Abdominal:      General: Bowel sounds are normal.      Palpations: Abdomen is soft.   Musculoskeletal:      Right lower leg: No edema.      Left lower leg: No edema.   Skin:     General: Skin is warm and dry.      Capillary Refill: Capillary refill takes less than 2 seconds.   Neurological:      General: No focal deficit present.      Mental Status: She is alert and oriented to person, place, and time.   Psychiatric:         Mood and Affect: Mood normal.         Thought Content: Thought content normal.         Procedure   Procedures       Assessment & Plan    Diagnosis Plan   1. Palpitations        2. PSVT (paroxysmal supraventricular tachycardia)        3. Autonomic dysfunction          Plan:  1.  Due to ongoing palpitations and known episodes of PSVT, will start the patient on a low-dose beta-blocker to see if this will improve symptoms.  The patient was advised to monitor her heart rate and blood pressure closely when starting the medication.  Will see her back in 3 months to evaluate effectiveness.  She was advised to notify our office prior to her appointment if she has further issues.  Return in about 3 months (around 9/6/2024).    Yue Maloney   reports that she has been smoking cigarettes. She has a 14 pack-year smoking history. She has never used smokeless tobacco. I have educated her on the risk of diseases from using tobacco products such as cancer, COPD, and heart disease.     I advised her to quit and she is not willing to quit.    I spent 3  minutes counseling the patient.           BMI is >= 30 and <35. (Class 1 Obesity). The following options were offered after discussion;: referral to primary care           MEDS ORDERED DURING VISIT:  New Medications Ordered This Visit   Medications    metoprolol tartrate (LOPRESSOR) 25 MG tablet     Sig: Take 0.5 tablets by mouth 2 (Two) Times a Day.     Dispense:  30 tablet     Refill:  3       DISCONTINUED MEDS DURING VISIT:   There are no discontinued medications.       This document has been electronically signed by OCTAVIO Soto  June 6, 2024 11:56 EDT    Dictated Utilizing Dragon Dictation: Part of this note may be an electronic transcription/translation of spoken language to printed text using the Dragon Dictation System

## 2024-09-09 ENCOUNTER — OFFICE VISIT (OUTPATIENT)
Dept: CARDIOLOGY | Facility: CLINIC | Age: 29
End: 2024-09-09
Payer: COMMERCIAL

## 2024-09-09 VITALS
DIASTOLIC BLOOD PRESSURE: 92 MMHG | OXYGEN SATURATION: 99 % | HEIGHT: 64 IN | SYSTOLIC BLOOD PRESSURE: 133 MMHG | WEIGHT: 179.8 LBS | BODY MASS INDEX: 30.7 KG/M2 | HEART RATE: 74 BPM

## 2024-09-09 DIAGNOSIS — E78.5 DYSLIPIDEMIA: ICD-10-CM

## 2024-09-09 DIAGNOSIS — R00.2 PALPITATIONS: Primary | ICD-10-CM

## 2024-09-09 DIAGNOSIS — I47.10 PSVT (PAROXYSMAL SUPRAVENTRICULAR TACHYCARDIA): ICD-10-CM

## 2024-09-09 PROCEDURE — 99213 OFFICE O/P EST LOW 20 MIN: CPT | Performed by: CLINICAL NURSE SPECIALIST

## 2024-09-09 RX ORDER — ROSUVASTATIN CALCIUM 20 MG/1
20 TABLET, COATED ORAL DAILY
Qty: 90 TABLET | Refills: 3 | Status: SHIPPED | OUTPATIENT
Start: 2024-09-09

## 2024-09-09 NOTE — PROGRESS NOTES
Subjective     Yue Birdie Maloney is a 29 y.o. female who presents today for Follow-up (3 month follow up).    CHIEF COMPLIANT  Chief Complaint   Patient presents with    Follow-up     3 month follow up       Active Problems:  Palpitations  1.1 cardiac event monitor 12/2023: 2 episodes of SVT no patient triggered.  Episodes of sinus tachycardia with rate up to 184.  Episode of sinus bradycardia with heart rate 45 bpm.  Patient triggered events associated with sinus mechanism.  Chest pain  2.1 2/8/24: treadmill stress: No EKG evidence of ischemia.  No exercise-induced dysrhythmia.  Somewhat exaggerated heart rate and blood pressure response to exercise.  Dizziness  Current smoker  5.  Echocardiogram 2/8/2024: EF 55 to 60%.  No significant valvular abnormality.       HPI  The patient is a 29-year-old female that returns for follow-up.  She states since her last visit her palpitation symptoms have improved.  This is still happening around 3 times per month but she is relatively asymptomatic with it.  She did not start taking the low-dose beta-blocker.  She denies chest pain, dyspnea, syncope, near syncope or palpitations.  Overall she states she is doing better.    PRIOR MEDS  Current Outpatient Medications on File Prior to Visit   Medication Sig Dispense Refill    metoprolol tartrate (LOPRESSOR) 25 MG tablet Take 0.5 tablets by mouth 2 (Two) Times a Day. 30 tablet 3    [DISCONTINUED] rosuvastatin (CRESTOR) 20 MG tablet Take 1 tablet by mouth Daily. 30 tablet 11     No current facility-administered medications on file prior to visit.       ALLERGIES  Sulfamethoxazole-trimethoprim    HISTORY  Past Medical History:   Diagnosis Date    Pleurisy        Social History     Socioeconomic History    Marital status:    Tobacco Use    Smoking status: Every Day     Current packs/day: 1.00     Average packs/day: 1 pack/day for 14.0 years (14.0 ttl pk-yrs)     Types: Cigarettes    Smokeless tobacco: Never   Vaping Use     "Vaping status: Never Used   Substance and Sexual Activity    Alcohol use: Never    Drug use: Never    Sexual activity: Defer       Family History   Problem Relation Age of Onset    Hypertension Mother     Other Father         small arteries    Heart murmur Sister     Hyperlipidemia Maternal Grandmother     Hypertension Maternal Grandmother     Lung cancer Maternal Grandmother     Diabetes Maternal Grandmother     COPD Maternal Grandmother     COPD Maternal Grandfather     Hypertension Maternal Grandfather     Heart attack Maternal Grandfather         defibrillator    Other Maternal Grandfather     Hypertension Paternal Grandmother     Stomach cancer Paternal Grandmother     Hyperthyroidism Paternal Grandmother     Heart murmur Paternal Grandmother     COPD Paternal Grandfather     Heart failure Son     Other Son         VSD    Heart murmur Son        Review of Systems   Constitutional:  Negative for fever.   HENT:  Negative for congestion, postnasal drip, rhinorrhea, sinus pressure and sinus pain.    Respiratory:  Negative for chest tightness and shortness of breath.    Cardiovascular:  Positive for palpitations. Negative for chest pain and leg swelling.   Gastrointestinal: Negative.    Genitourinary: Negative.    Neurological:  Negative for dizziness, syncope and headaches.   Hematological:  Does not bruise/bleed easily.   Psychiatric/Behavioral:  Negative for sleep disturbance.        Objective     VITALS: /92 (BP Location: Left arm, Patient Position: Sitting, Cuff Size: Adult)   Pulse 74   Ht 162.6 cm (64.02\")   Wt 81.6 kg (179 lb 12.8 oz)   SpO2 99%   BMI 30.85 kg/m²     LABS:   Lab Results (most recent)       None            IMAGING:   No Images in the past 120 days found..    EXAM:    Constitutional:       Appearance: Normal appearance.   Eyes:      Pupils: Pupils are equal, round, and reactive to light.   Cardiovascular:      Rate and Rhythm: Normal rate and regular rhythm.      Pulses:           " Carotid pulses are 2+ on the right side and 2+ on the left side.       Radial pulses are 2+ on the right side and 2+ on the left side.        Dorsalis pedis pulses are 2+ on the right side and 2+ on the left side.        Posterior tibial pulses are 2+ on the right side and 2+ on the left side.      Heart sounds: Normal heart sounds.   Pulmonary:      Effort: Pulmonary effort is normal.      Breath sounds: Normal breath sounds.   Abdominal:      General: Bowel sounds are normal.      Palpations: Abdomen is soft.   Musculoskeletal:      Right lower leg: No edema.      Left lower leg: No edema.   Skin:     General: Skin is warm and dry.      Capillary Refill: Capillary refill takes less than 2 seconds.   Neurological:      General: No focal deficit present.      Mental Status: She is alert and oriented to person, place, and time.   Psychiatric:         Mood and Affect: Mood normal.         Thought Content: Thought content normal.   Procedure   Procedures       Assessment & Plan    Diagnosis Plan   1. Palpitations        2. PSVT (paroxysmal supraventricular tachycardia)        3. Dyslipidemia          Plan:  1.  Palpitations: The patient states her symptoms have improved since her last visit.  She does have low-dose metoprolol tartrate but has not taking this medication.  Unless absolutely necessary she would like to stay off medication.  She was advised to notify our office if palpitation symptoms return or worsen.  2.  PSVT: Plan as above.  3.  Dyslipidemia: Will continue rosuvastatin.  At her next visit we will plan to recheck a lipid panel and CMP.    Return in about 6 months (around 3/9/2025).    Yue Maloney  reports that she has been smoking cigarettes. She has a 14 pack-year smoking history. She has never used smokeless tobacco. I have educated her on the risk of diseases from using tobacco products such as cancer, COPD, and heart disease.     I advised her to quit and she is not willing to quit.    I  spent 3  minutes counseling the patient.                       MEDS ORDERED DURING VISIT:  New Medications Ordered This Visit   Medications    rosuvastatin (CRESTOR) 20 MG tablet     Sig: Take 1 tablet by mouth Daily.     Dispense:  90 tablet     Refill:  3       DISCONTINUED MEDS DURING VISIT:   Medications Discontinued During This Encounter   Medication Reason    rosuvastatin (CRESTOR) 20 MG tablet Reorder          This document has been electronically signed by OCTAVIO Soto  September 9, 2024 10:38 EDT    Dictated Utilizing Dragon Dictation: Part of this note may be an electronic transcription/translation of spoken language to printed text using the Dragon Dictation System

## 2024-10-07 RX ORDER — METOPROLOL TARTRATE 25 MG/1
12.5 TABLET, FILM COATED ORAL 2 TIMES DAILY
Qty: 30 TABLET | Refills: 5 | Status: SHIPPED | OUTPATIENT
Start: 2024-10-07

## 2025-03-10 ENCOUNTER — OFFICE VISIT (OUTPATIENT)
Dept: CARDIOLOGY | Facility: CLINIC | Age: 30
End: 2025-03-10
Payer: COMMERCIAL

## 2025-03-10 ENCOUNTER — LAB (OUTPATIENT)
Dept: LAB | Facility: HOSPITAL | Age: 30
End: 2025-03-10
Payer: COMMERCIAL

## 2025-03-10 VITALS
BODY MASS INDEX: 30.86 KG/M2 | DIASTOLIC BLOOD PRESSURE: 91 MMHG | OXYGEN SATURATION: 99 % | HEIGHT: 64 IN | HEART RATE: 74 BPM | SYSTOLIC BLOOD PRESSURE: 129 MMHG

## 2025-03-10 DIAGNOSIS — R00.2 PALPITATIONS: Primary | ICD-10-CM

## 2025-03-10 DIAGNOSIS — I47.10 PSVT (PAROXYSMAL SUPRAVENTRICULAR TACHYCARDIA): ICD-10-CM

## 2025-03-10 DIAGNOSIS — E78.5 DYSLIPIDEMIA: ICD-10-CM

## 2025-03-10 LAB
ALBUMIN SERPL-MCNC: 4.5 G/DL (ref 3.5–5.2)
ALP SERPL-CCNC: 84 U/L (ref 39–117)
ALT SERPL W P-5'-P-CCNC: 16 U/L (ref 1–33)
AST SERPL-CCNC: 19 U/L (ref 1–32)
BILIRUB CONJ SERPL-MCNC: 0.1 MG/DL (ref 0–0.3)
BILIRUB INDIRECT SERPL-MCNC: 0.4 MG/DL
BILIRUB SERPL-MCNC: 0.5 MG/DL (ref 0–1.2)
CHOLEST SERPL-MCNC: 162 MG/DL (ref 0–200)
HDLC SERPL-MCNC: 35 MG/DL (ref 40–60)
LDLC SERPL CALC-MCNC: 102 MG/DL (ref 0–100)
LDLC/HDLC SERPL: 2.82 {RATIO}
PROT SERPL-MCNC: 7.7 G/DL (ref 6–8.5)
TRIGL SERPL-MCNC: 141 MG/DL (ref 0–150)
VLDLC SERPL-MCNC: 25 MG/DL (ref 5–40)

## 2025-03-10 PROCEDURE — 99214 OFFICE O/P EST MOD 30 MIN: CPT | Performed by: CLINICAL NURSE SPECIALIST

## 2025-03-10 PROCEDURE — 93000 ELECTROCARDIOGRAM COMPLETE: CPT | Performed by: CLINICAL NURSE SPECIALIST

## 2025-03-10 PROCEDURE — 80061 LIPID PANEL: CPT

## 2025-03-10 PROCEDURE — 36415 COLL VENOUS BLD VENIPUNCTURE: CPT

## 2025-03-10 PROCEDURE — 80076 HEPATIC FUNCTION PANEL: CPT

## 2025-03-10 NOTE — PROGRESS NOTES
Subjective     Yue Maloney is a 29 y.o. female who presents today for Follow-up (6mth).    CHIEF COMPLIANT  Chief Complaint   Patient presents with    Follow-up     6mth       Active Problems:  Palpitations  1.1 cardiac event monitor 12/2023: 2 episodes of SVT no patient triggered.  Episodes of sinus tachycardia with rate up to 184.  Episode of sinus bradycardia with heart rate 45 bpm.  Patient triggered events associated with sinus mechanism.  Chest pain  2.1 2/8/24: treadmill stress: No EKG evidence of ischemia.  No exercise-induced dysrhythmia.  Somewhat exaggerated heart rate and blood pressure response to exercise.  Dizziness  Current smoker  5.  Echocardiogram 2/8/2024: EF 55 to 60%.  No significant valvular abnormality.    HPI  The patient is a 29-year-old female that returns for follow-up.  Overall she is doing well.  Palpitation symptoms are still occurring on a rare occasion but overall have improved.  She has not had to take any metoprolol.  She denies chest pain, dyspnea, syncope, near syncope.  It is time to recheck a lipid panel.  Her EKG shows normal sinus rhythm with no acute ST or T wave abnormalities.    PRIOR MEDS  Current Outpatient Medications on File Prior to Visit   Medication Sig Dispense Refill    metoprolol tartrate (LOPRESSOR) 25 MG tablet TAKE 1/2 TABLET BY MOUTH TWICE A DAY (Patient taking differently: Take 0.5 tablets by mouth. Only take when having a high heart rate) 30 tablet 5    rosuvastatin (CRESTOR) 20 MG tablet Take 1 tablet by mouth Daily. 90 tablet 3     No current facility-administered medications on file prior to visit.       ALLERGIES  Sulfamethoxazole-trimethoprim    HISTORY  Past Medical History:   Diagnosis Date    Pleurisy        Social History     Socioeconomic History    Marital status:    Tobacco Use    Smoking status: Every Day     Current packs/day: 1.00     Average packs/day: 1 pack/day for 14.0 years (14.0 ttl pk-yrs)     Types: Cigarettes     "Smokeless tobacco: Never   Vaping Use    Vaping status: Never Used   Substance and Sexual Activity    Alcohol use: Never    Drug use: Never    Sexual activity: Defer       Family History   Problem Relation Age of Onset    Hypertension Mother     Other Father         small arteries    Heart murmur Sister     Hyperlipidemia Maternal Grandmother     Hypertension Maternal Grandmother     Lung cancer Maternal Grandmother     Diabetes Maternal Grandmother     COPD Maternal Grandmother     COPD Maternal Grandfather     Hypertension Maternal Grandfather     Heart attack Maternal Grandfather         defibrillator    Other Maternal Grandfather     Hypertension Paternal Grandmother     Stomach cancer Paternal Grandmother     Hyperthyroidism Paternal Grandmother     Heart murmur Paternal Grandmother     COPD Paternal Grandfather     Heart failure Son     Other Son         VSD    Heart murmur Son        Review of Systems   Constitutional:  Positive for fatigue.   Respiratory:  Negative for chest tightness and shortness of breath.    Cardiovascular:  Positive for palpitations. Negative for chest pain and leg swelling.   Neurological:  Positive for headaches (has about three HA a week). Negative for dizziness, syncope, weakness and numbness.   Hematological:  Does not bruise/bleed easily.   Psychiatric/Behavioral:  Negative for sleep disturbance.        Objective     VITALS: /91   Pulse 74   Ht 162.6 cm (64\")   SpO2 99%   BMI 30.86 kg/m²     LABS:   Lab Results (most recent)       None            IMAGING:   No Images in the past 120 days found..    EXAM:  Physical Exam  Constitutional:       Appearance: Normal appearance.   Eyes:      Pupils: Pupils are equal, round, and reactive to light.   Cardiovascular:      Rate and Rhythm: Normal rate and regular rhythm.      Pulses:           Carotid pulses are 2+ on the right side and 2+ on the left side.       Radial pulses are 2+ on the right side and 2+ on the left side.       "  Dorsalis pedis pulses are 2+ on the right side and 2+ on the left side.        Posterior tibial pulses are 2+ on the right side and 2+ on the left side.      Heart sounds: Normal heart sounds.   Pulmonary:      Effort: Pulmonary effort is normal.      Breath sounds: Normal breath sounds.   Abdominal:      General: Bowel sounds are normal.      Palpations: Abdomen is soft.   Musculoskeletal:      Right lower leg: No edema.      Left lower leg: No edema.   Skin:     General: Skin is warm and dry.      Capillary Refill: Capillary refill takes less than 2 seconds.   Neurological:      General: No focal deficit present.      Mental Status: She is alert and oriented to person, place, and time.   Psychiatric:         Mood and Affect: Mood normal.         Thought Content: Thought content normal.         Procedure     ECG 12 Lead    Date/Time: 3/10/2025 8:33 AM  Performed by: Yue Castro APRN    Authorized by: Yue Castro APRN  Comparison: compared with previous ECG from 12/5/2023  Similar to previous ECG  Rhythm: sinus rhythm  Rate: normal  BPM: 73  Conduction: conduction normal  ST Segments: ST segments normal  T Waves: T waves normal  QRS axis: normal  Comments: No acute changes  Qtc 427ms             Assessment & Plan    Diagnosis Plan   1. Palpitations  ECG 12 Lead      2. PSVT (paroxysmal supraventricular tachycardia)  ECG 12 Lead      3. Dyslipidemia  ECG 12 Lead    Lipid Panel    Hepatic Function Panel           Plan:  1.  Palpitations: Remains with rare palpitations but overall states this has improved.  She does have low-dose metoprolol tartrate but has not taking this medication.  Unless absolutely necessary she would like to stay off medication.  She was advised to notify our office if palpitation symptoms return or worsen.  2.  PSVT: Plan as above.  3.  Dyslipidemia: Will continue rosuvastatin.  Will check a lipid panel and hepatic function panel.      Return in about 1 year (around  3/10/2026).    Yue Maloney  reports that she has been smoking cigarettes. She has a 14 pack-year smoking history. She has never used smokeless tobacco. I have educated her on the risk of diseases from using tobacco products such as cancer, COPD, and heart disease.     I advised her to quit and she is not willing to quit.         MEDS ORDERED DURING VISIT:  No orders of the defined types were placed in this encounter.      DISCONTINUED MEDS DURING VISIT:   There are no discontinued medications.       This document has been electronically signed by OCTAVIO Soto  March 10, 2025 09:14 EDT    Dictated Utilizing Dragon Dictation: Part of this note may be an electronic transcription/translation of spoken language to printed text using the Dragon Dictation System

## 2025-03-11 ENCOUNTER — RESULTS FOLLOW-UP (OUTPATIENT)
Dept: CARDIOLOGY | Facility: CLINIC | Age: 30
End: 2025-03-11
Payer: COMMERCIAL

## 2025-03-11 DIAGNOSIS — E78.5 DYSLIPIDEMIA: Primary | ICD-10-CM

## 2025-03-11 RX ORDER — ROSUVASTATIN CALCIUM 40 MG/1
40 TABLET, COATED ORAL DAILY
Qty: 90 TABLET | Refills: 3 | Status: SHIPPED | OUTPATIENT
Start: 2025-03-11

## 2025-03-11 NOTE — PROGRESS NOTES
LDL is back up to 102, HDL remains low at 35.  Overall this is still a significant improvement from a year ago.  I would like to increase rosuvastatin to 40mg daily and repeat a lipid panel and hepatic function in 3-4 months.

## 2025-03-11 NOTE — TELEPHONE ENCOUNTER
Yue Castro APRN to Me  (Selected Message)      3/11/25 10:15 AM  Note      LDL is back up to 102, HDL remains low at 35.  Overall this is still a significant improvement from a year ago.  I would like to increase rosuvastatin to 40mg daily and repeat a lipid panel and hepatic function in 3-4 months.

## 2025-04-15 RX ORDER — METOPROLOL TARTRATE 25 MG/1
12.5 TABLET, FILM COATED ORAL 2 TIMES DAILY
Qty: 90 TABLET | Refills: 3 | Status: SHIPPED | OUTPATIENT
Start: 2025-04-15

## 2025-07-09 ENCOUNTER — LAB (OUTPATIENT)
Dept: LAB | Facility: HOSPITAL | Age: 30
End: 2025-07-09
Payer: COMMERCIAL

## 2025-07-09 DIAGNOSIS — E78.5 DYSLIPIDEMIA: ICD-10-CM

## 2025-07-09 LAB
ALBUMIN SERPL-MCNC: 4.5 G/DL (ref 3.5–5.2)
ALP SERPL-CCNC: 75 U/L (ref 39–117)
ALT SERPL W P-5'-P-CCNC: 18 U/L (ref 1–33)
AST SERPL-CCNC: 20 U/L (ref 1–32)
BILIRUB CONJ SERPL-MCNC: 0.2 MG/DL (ref 0–0.3)
BILIRUB INDIRECT SERPL-MCNC: 0.2 MG/DL
BILIRUB SERPL-MCNC: 0.4 MG/DL (ref 0–1.2)
CHOLEST SERPL-MCNC: 137 MG/DL (ref 0–200)
HDLC SERPL-MCNC: 31 MG/DL (ref 40–60)
LDLC SERPL CALC-MCNC: 80 MG/DL (ref 0–100)
LDLC/HDLC SERPL: 2.46 {RATIO}
PROT SERPL-MCNC: 7.2 G/DL (ref 6–8.5)
TRIGL SERPL-MCNC: 148 MG/DL (ref 0–150)
VLDLC SERPL-MCNC: 26 MG/DL (ref 5–40)

## 2025-07-09 PROCEDURE — 80061 LIPID PANEL: CPT

## 2025-07-09 PROCEDURE — 80076 HEPATIC FUNCTION PANEL: CPT

## 2025-07-09 PROCEDURE — 36415 COLL VENOUS BLD VENIPUNCTURE: CPT

## 2025-07-11 ENCOUNTER — RESULTS FOLLOW-UP (OUTPATIENT)
Dept: CARDIOLOGY | Facility: CLINIC | Age: 30
End: 2025-07-11
Payer: COMMERCIAL

## 2025-07-11 NOTE — PROGRESS NOTES
LDL has went from 102 down to 80.  HDL remains low at 31.  Overall cholesterol has went from 162 down to 137.  Continue rosuvastatin.

## 2025-07-14 NOTE — TELEPHONE ENCOUNTER
Yue Castro APRN to Me  (Selected Message)      7/11/25  1:16 PM  Note      LDL has went from 102 down to 80.  HDL remains low at 31.  Overall cholesterol has went from 162 down to 137.  Continue rosuvastatin.         Lipid Panel; Hepatic Function Panel